# Patient Record
Sex: MALE | Race: WHITE | Employment: FULL TIME | URBAN - METROPOLITAN AREA
[De-identification: names, ages, dates, MRNs, and addresses within clinical notes are randomized per-mention and may not be internally consistent; named-entity substitution may affect disease eponyms.]

---

## 2017-03-08 DIAGNOSIS — F51.01 PRIMARY INSOMNIA: ICD-10-CM

## 2017-03-08 RX ORDER — ZOLPIDEM TARTRATE 10 MG/1
10 TABLET ORAL
Qty: 14 TABLET | Refills: 0 | Status: SHIPPED | OUTPATIENT
Start: 2017-03-08 | End: 2017-05-22

## 2017-03-08 NOTE — TELEPHONE ENCOUNTER
Ambien 10mg      Last Written Prescription Date:  12/9/16  Last Fill Quantity: 14,   # refills: 0  Last Office Visit with [facility], [facility] or [facility] prescribing provider: 2/17/16  Future Office visit:       Routing refill request to provider for review/approval because:  Drug not on the [facility], [facility] or [facility] refill protocol or controlled substance      Azar Guardado CPhT  [facility]    On behalf of [facility]

## 2017-05-04 DIAGNOSIS — I10 BENIGN ESSENTIAL HYPERTENSION: ICD-10-CM

## 2017-05-04 RX ORDER — LISINOPRIL 10 MG/1
TABLET ORAL
Qty: 30 TABLET | Refills: 0 | Status: SHIPPED | OUTPATIENT
Start: 2017-05-04 | End: 2017-08-23

## 2017-05-04 NOTE — TELEPHONE ENCOUNTER
Last OV-2/17/16    Last Basic Metabolic Panel:  Lab Results   Component Value Date     03/07/2016      Lab Results   Component Value Date    POTASSIUM 4.2 03/07/2016     Lab Results   Component Value Date    CHLORIDE 102 03/07/2016     Lab Results   Component Value Date    SHARON 8.8 03/07/2016     Lab Results   Component Value Date    CO2 27 03/07/2016     Lab Results   Component Value Date    BUN 12 03/07/2016     Lab Results   Component Value Date    CR 0.69 03/07/2016     Lab Results   Component Value Date    GLC 87 03/07/2016

## 2017-05-22 DIAGNOSIS — F51.01 PRIMARY INSOMNIA: ICD-10-CM

## 2017-05-22 RX ORDER — ZOLPIDEM TARTRATE 10 MG/1
10 TABLET ORAL
Qty: 14 TABLET | Refills: 0 | Status: SHIPPED | OUTPATIENT
Start: 2017-05-22 | End: 2017-08-25

## 2017-08-21 DIAGNOSIS — F51.01 PRIMARY INSOMNIA: ICD-10-CM

## 2017-08-21 DIAGNOSIS — I10 BENIGN ESSENTIAL HYPERTENSION: ICD-10-CM

## 2017-08-21 RX ORDER — LISINOPRIL 10 MG/1
TABLET ORAL
Qty: 30 TABLET | Refills: 0 | OUTPATIENT
Start: 2017-08-21

## 2017-08-21 RX ORDER — ZOLPIDEM TARTRATE 10 MG/1
10 TABLET ORAL
Qty: 14 TABLET | Refills: 0 | OUTPATIENT
Start: 2017-08-21

## 2017-08-21 NOTE — TELEPHONE ENCOUNTER
Ambien 10mg      Last Written Prescription Date: 05/22/2017  Last Fill Quantity: 14,  # refills: 0   Last Office Visit with [facility], [facility] or [facility] prescribing provider: 02/17/2017    Thanks!    Alia Pranke, PharmacyTechnician   [facility]

## 2017-08-21 NOTE — TELEPHONE ENCOUNTER
Lisinopril      Last Written Prescription Date: 05/04/17  Last Fill Quantity: 30, # refills: 0  Last Office Visit with [facility], [facility] or [facility] prescribing provider: 02/17/17 Avelar       Potassium   Date Value Ref Range Status   03/07/2016 4.2 3.4 - 5.3 mmol/L Final     Creatinine   Date Value Ref Range Status   03/07/2016 0.69 0.66 - 1.25 mg/dL Final     BP Readings from Last 3 Encounters:   03/07/16 122/70   02/17/16 (!) 160/106   02/18/15 (!) 178/100

## 2017-08-23 ENCOUNTER — TELEPHONE (OUTPATIENT)
Dept: INTERNAL MEDICINE | Facility: CLINIC | Age: 42
End: 2017-08-23

## 2017-08-23 DIAGNOSIS — I10 BENIGN ESSENTIAL HYPERTENSION: ICD-10-CM

## 2017-08-23 RX ORDER — LISINOPRIL 10 MG/1
10 TABLET ORAL DAILY
Qty: 30 TABLET | Refills: 0 | Status: SHIPPED | OUTPATIENT
Start: 2017-08-23 | End: 2017-08-25

## 2017-08-23 NOTE — TELEPHONE ENCOUNTER
Patient would like refill of lisonpril does have an appt for 8/25 with Lavern, needs bridge fill to keep pressures down due to upcoming dental appt. Please call him at [phone], ok to leave a message.

## 2017-08-23 NOTE — TELEPHONE ENCOUNTER
Lisinopril      Last Written Prescription Date: 5/4/17  Last Fill Quantity: 30, # refills: 0  Last Office Visit with [facility], [facility] or [facility] prescribing provider: 2/17/16  Next 5 appointments (look out 90 days)     Aug 25, 2017  8:40 AM CDT   [facility] Whitney C[facility]   [facility]    [address]  [facility] [address]   [phone]                   Potassium   Date Value Ref Range Status   03/07/2016 4.2 3.4 - 5.3 mmol/L Final     Creatinine   Date Value Ref Range Status   03/07/2016 0.69 0.66 - 1.25 mg/dL Final     BP Readings from Last 3 Encounters:   03/07/16 122/70   02/17/16 (!) 160/106   02/18/15 (!) 178/100       Routing refill request to provider for review/approval because:  Patient needs to be seen because it has been more than 1 year since last office visit.

## 2017-08-25 ENCOUNTER — OFFICE VISIT (OUTPATIENT)
Dept: INTERNAL MEDICINE | Facility: CLINIC | Age: 42
End: 2017-08-25
Payer: COMMERCIAL

## 2017-08-25 VITALS
OXYGEN SATURATION: 98 % | BODY MASS INDEX: 29.35 KG/M2 | RESPIRATION RATE: 12 BRPM | HEIGHT: 70 IN | SYSTOLIC BLOOD PRESSURE: 152 MMHG | TEMPERATURE: 97.9 F | DIASTOLIC BLOOD PRESSURE: 92 MMHG | HEART RATE: 76 BPM | WEIGHT: 205 LBS

## 2017-08-25 DIAGNOSIS — I10 BENIGN ESSENTIAL HYPERTENSION: ICD-10-CM

## 2017-08-25 DIAGNOSIS — L72.9 SCALP CYST: ICD-10-CM

## 2017-08-25 DIAGNOSIS — Z13.6 CARDIOVASCULAR SCREENING; LDL GOAL LESS THAN 130: ICD-10-CM

## 2017-08-25 DIAGNOSIS — I10 ESSENTIAL HYPERTENSION: Primary | ICD-10-CM

## 2017-08-25 DIAGNOSIS — F51.01 PRIMARY INSOMNIA: ICD-10-CM

## 2017-08-25 LAB
ALBUMIN SERPL-MCNC: 4 G/DL (ref 3.4–5)
ALP SERPL-CCNC: 43 U/L (ref 40–150)
ALT SERPL W P-5'-P-CCNC: 56 U/L (ref 0–70)
ANION GAP SERPL CALCULATED.3IONS-SCNC: 8 MMOL/L (ref 3–14)
AST SERPL W P-5'-P-CCNC: 33 U/L (ref 0–45)
BASOPHILS # BLD AUTO: 0.1 10E9/L (ref 0–0.2)
BASOPHILS NFR BLD AUTO: 0.8 %
BILIRUB SERPL-MCNC: 0.5 MG/DL (ref 0.2–1.3)
BUN SERPL-MCNC: 12 MG/DL (ref 7–30)
CALCIUM SERPL-MCNC: 9.4 MG/DL (ref 8.5–10.1)
CHLORIDE SERPL-SCNC: 103 MMOL/L (ref 94–109)
CO2 SERPL-SCNC: 27 MMOL/L (ref 20–32)
CREAT SERPL-MCNC: 0.86 MG/DL (ref 0.66–1.25)
DIFFERENTIAL METHOD BLD: NORMAL
EOSINOPHIL # BLD AUTO: 0.2 10E9/L (ref 0–0.7)
EOSINOPHIL NFR BLD AUTO: 2.1 %
ERYTHROCYTE [DISTWIDTH] IN BLOOD BY AUTOMATED COUNT: 13 % (ref 10–15)
GFR SERPL CREATININE-BSD FRML MDRD: >90 ML/MIN/1.7M2
GLUCOSE SERPL-MCNC: 88 MG/DL (ref 70–99)
HCT VFR BLD AUTO: 44.3 % (ref 40–53)
HGB BLD-MCNC: 15.3 G/DL (ref 13.3–17.7)
LYMPHOCYTES # BLD AUTO: 2.4 10E9/L (ref 0.8–5.3)
LYMPHOCYTES NFR BLD AUTO: 33.8 %
MCH RBC QN AUTO: 30.8 PG (ref 26.5–33)
MCHC RBC AUTO-ENTMCNC: 34.5 G/DL (ref 31.5–36.5)
MCV RBC AUTO: 89 FL (ref 78–100)
MONOCYTES # BLD AUTO: 1 10E9/L (ref 0–1.3)
MONOCYTES NFR BLD AUTO: 13.6 %
NEUTROPHILS # BLD AUTO: 3.5 10E9/L (ref 1.6–8.3)
NEUTROPHILS NFR BLD AUTO: 49.7 %
PLATELET # BLD AUTO: 162 10E9/L (ref 150–450)
POTASSIUM SERPL-SCNC: 4 MMOL/L (ref 3.4–5.3)
PROT SERPL-MCNC: 7.7 G/DL (ref 6.8–8.8)
RBC # BLD AUTO: 4.96 10E12/L (ref 4.4–5.9)
SODIUM SERPL-SCNC: 138 MMOL/L (ref 133–144)
TSH SERPL DL<=0.005 MIU/L-ACNC: 1.72 MU/L (ref 0.4–4)
WBC # BLD AUTO: 7.1 10E9/L (ref 4–11)

## 2017-08-25 PROCEDURE — 99214 OFFICE O/P EST MOD 30 MIN: CPT | Performed by: NURSE PRACTITIONER

## 2017-08-25 PROCEDURE — 36415 COLL VENOUS BLD VENIPUNCTURE: CPT | Performed by: NURSE PRACTITIONER

## 2017-08-25 PROCEDURE — 80050 GENERAL HEALTH PANEL: CPT | Performed by: NURSE PRACTITIONER

## 2017-08-25 RX ORDER — LISINOPRIL 10 MG/1
10 TABLET ORAL DAILY
Qty: 90 TABLET | Refills: 1 | Status: SHIPPED | OUTPATIENT
Start: 2017-08-25 | End: 2018-03-16

## 2017-08-25 RX ORDER — ZOLPIDEM TARTRATE 10 MG/1
10 TABLET ORAL
Qty: 14 TABLET | Refills: 3 | Status: SHIPPED | OUTPATIENT
Start: 2017-08-25 | End: 2018-01-24

## 2017-08-25 NOTE — PROGRESS NOTES
"  SUBJECTIVE:   Avery Pimentel Jr. is a 42 year old male who presents to clinic today for the following health issues:      Hypertension Follow-up      Outpatient blood pressures 160/? At dentist last week.     Low Salt Diet: no added salt    Was out of medication for about 3 weeks time     On medication it is usually in normal range         Amount of exercise or physical activity: 2 to 3 days a week.    Problems taking medications regularly: Ran out of meds a few weeks ago.    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  Additional history: as documented    Patient Active Problem List   Diagnosis     NO ACTIVE PROBLEMS     HTN (hypertension)     Fatigue     Insomnia     CARDIOVASCULAR SCREENING; LDL GOAL LESS THAN 130     Overweight     Elevated triglycerides with high cholesterol     Past Surgical History:   Procedure Laterality Date     NO HISTORY OF SURGERY         Social History   Substance Use Topics     Smoking status: Never Smoker     Smokeless tobacco: Not on file     Alcohol use Yes      Comment: once weekly     Family History   Problem Relation Age of Onset     CANCER Mother      cervical     Hypertension Brother      Hypertension Father              Reviewed and updated as needed this visit by clinical staffTobacco  Allergies  Meds       Reviewed and updated as needed this visit by Provider         ROS:  Constitutional, HEENT, cardiovascular, pulmonary, GI, , musculoskeletal, neuro, skin, endocrine and psych systems are negative, except as otherwise noted.      OBJECTIVE:   BP (!) 152/92 (BP Location: Left arm, Patient Position: Chair, Cuff Size: Adult Large)  Pulse 76  Temp 97.9  F (36.6  C) (Oral)  Resp 12  Ht 5' 10\" (1.778 m)  Wt 205 lb (93 kg)  SpO2 98%  BMI 29.41 kg/m2  Body mass index is 29.41 kg/(m^2).  GENERAL: alert and " no distress  RESP: lungs clear to auscultation - no rales, rhonchi or wheezes  CV: regular rate and rhythm, normal S1 S2, no S3 or S4, no murmur, click or rub, no peripheral edema  ABDOMEN: soft, nontender, and bowel sounds normal  MS: no gross musculoskeletal defects noted, no edema  NEURO: Normal strength and tone, mentation intact and speech normal  SKIN: 2 large posterior scalp cyst appearing lesions  PSYCH: mentation appears normal, affect normal/bright    Diagnostic Test Results:  Lab now   Lab future     ASSESSMENT/PLAN:             1. Essential hypertension  Not controlled as out of medication for 3 weeks   On medication blood pressure is normal per report- asked to do blood pressure check after on medication to make sure medication is working as desired   - CBC with platelets differential  - Comprehensive metabolic panel  - TSH with free T4 reflex    2. CARDIOVASCULAR SCREENING; LDL GOAL LESS THAN 130    - Lipid panel reflex to direct LDL; Future    3. Benign essential hypertension    - lisinopril (PRINIVIL/ZESTRIL) 10 MG tablet; Take 1 tablet (10 mg) by mouth daily  Dispense: 90 tablet; Refill: 1    4. 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 Scalp cyst  2 large posterior scalp cyst appearing lesions  - GENERAL SURG ADULT REFERRAL    Patient Instructions   Lab in [address]    Lab fasting when you can   You need to be fasting for 12hrs. You can have water and any meds you are on. But, no food, coffee, tea or diet pop.    Lisinopril refill and ambien    Recheck blood pressure in pharmacy or nurse only in 1-2 weeks     Follow up with any questions or concerns.           ELLIOTT Whitney [facility]

## 2017-08-25 NOTE — MR AVS SNAPSHOT
After Visit Summary   8/25/2017    Avery Pimentel Jr.    MRN: [id number]           Patient Information     Date Of Birth          1975        Visit Information        Provider Department      8/25/2017 8:40 AM Bita Puckett APRN CNP [facility]        Today's Diagnoses     Essential hypertension    -  1    CARDIOVASCULAR SCREENING; LDL GOAL LESS THAN 130        Benign essential hypertension        Primary insomnia          Care Instructions    Lab in [address]    Lab fasting when you can   You need to be fasting for 12hrs. You can have water and any meds you are on. But, no food, coffee, tea or diet pop.    Lisinopril refill and ambien    Recheck blood pressure in pharmacy or nurse only in 1-2 weeks     Follow up with any questions or concerns.               Follow-ups after your visit        Future tests that were ordered for you today     Open Future Orders        Priority Expected Expires Ordered    Lipid panel reflex to direct LDL Routine  8/25/2018 8/25/2017            Who to contact     If you have questions or need follow up information about today's clinic visit or your schedule please contact [facility] directly at [phone].  Normal or non-critical lab and imaging results will be communicated to you by SportsBlogshart, letter or phone within 4 business days after the clinic has received the results. If you do not hear from us within 7 days, please contact the clinic through SportsBlog[employer] or phone. If you have a critical or abnormal lab result, we will notify you by phone as soon as possible.  Submit refill requests through [employer] or call your pharmacy and they will forward the refill request to us. Please allow 3 business days for your refill to be completed.          Additional Information About Your Visit        MyChart Information     [employer] lets you send messages to your doctor, view your test results, renew your prescriptions, schedule appointments  "and more. To sign up, go to [email or web address] . Click on \"Log in\" on the left side of the screen, which will take you to the Welcome page. Then click on \"Sign up Now\" on the right side of the page.     You will be asked to enter the access code listed below, as well as some personal information. Please follow the directions to create your username and password.     Your access code is: [email or web address]  Expires: 2017  9:20 AM     Your access code will  in 90 days. If you need help or a new code, please call your [place] clinic or [phone].        Care EveryWhere ID     This is your Care EveryWhere ID. This could be used by other organizations to access your [place] medical records  [id number]        Your Vitals Were     Pulse Temperature Respirations Height Pulse Oximetry BMI (Body Mass Index)    76 97.9  F (36.6  C) (Oral) 12 5' 10\" (1.778 m) 98% 29.41 kg/m2       Blood Pressure from Last 3 Encounters:   17 (!) 152/92   16 122/70   16 (!) 160/106    Weight from Last 3 Encounters:   17 205 lb (93 kg)   16 200 lb 1.6 oz (90.8 kg)   02/18/15 219 lb 3.2 oz (99.4 kg)              We Performed the Following     CBC with platelets differential     Comprehensive metabolic panel     TSH with free T4 reflex          Where to get your medicines      These medications were sent to [facility] [place], [place] - [address]  [address] [facility] [address]     Phone:  [phone]     lisinopril 10 MG tablet         Some of these will need a paper prescription and others can be bought over the counter.  Ask your nurse if you have questions.     Bring a paper prescription for each of these medications     zolpidem 10 MG tablet          Primary Care Provider Office Phone # Fax #    Mohsen Pelaez [phone]       [address]  [facility] [address]        Equal Access to Services     WILVER TODD: Gisel sandra" west Daniel, heatherda lunevinadaha, qaybta kadariusz barraza, michael sampson mikaelwhitley ruddyjose laanayaperla vira. So [facility] [phone].    ATENCIÓN: Si habla español, tiene a merrill disposición servicios gratuitos de asistencia lingüística. Rosy al [phone].    We comply with applicable federal civil rights laws and [place] laws. We do not discriminate on the basis of race, color, national origin, age, disability sex, sexual orientation or gender identity.            Thank you!     Thank you for choosing [facility]  for your care. Our goal is always to provide you with excellent care. Hearing back from our patients is one way we can continue to improve our services. Please take a few minutes to complete the written survey that you may receive in the mail after your visit with us. Thank you!             Your Updated Medication List - Protect others around you: Learn how to safely use, store and throw away your medicines at www.disposemymeds.org.          This list is accurate as of: 8/25/17  9:20 AM.  Always use your most recent med list.                   Brand Name Dispense Instructions for use Diagnosis    lisinopril 10 MG tablet    PRINIVIL/ZESTRIL    90 tablet    Take 1 tablet (10 mg) by mouth daily    Benign essential hypertension       zolpidem 10 MG tablet    AMBIEN    14 tablet    Take 1 tablet (10 mg) by mouth nightly as needed for sleep    Primary insomnia

## 2017-08-25 NOTE — PATIENT INSTRUCTIONS
Lab in [address]    Lab fasting when you can   You need to be fasting for 12hrs. You can have water and any meds you are on. But, no food, coffee, tea or diet pop.    Lisinopril refill and ambien    Recheck blood pressure in pharmacy or nurse only in 1-2 weeks     Follow up with any questions or concerns.

## 2017-08-25 NOTE — NURSING NOTE
"Chief Complaint   Patient presents with     Recheck Medication     Pt has been out of meds for a few weeks.        Initial BP (!) 152/92 (BP Location: Left arm, Patient Position: Chair, Cuff Size: Adult Large)  Pulse 76  Temp 97.9  F (36.6  C) (Oral)  Resp 12  Ht 5' 10\" (1.778 m)  Wt 205 lb (93 kg)  SpO2 98%  BMI 29.41 kg/m2 Estimated body mass index is 29.41 kg/(m^2) as calculated from the following:    Height as of this encounter: 5' 10\" (1.778 m).    Weight as of this encounter: 205 lb (93 kg).  Medication Reconciliation: complete     STEPAN Roland      "

## 2017-09-09 DIAGNOSIS — I10 BENIGN ESSENTIAL HYPERTENSION: ICD-10-CM

## 2017-09-11 RX ORDER — LISINOPRIL 10 MG/1
TABLET ORAL
Qty: 30 TABLET | Refills: 0 | OUTPATIENT
Start: 2017-09-11

## 2017-11-22 ENCOUNTER — TELEPHONE (OUTPATIENT)
Dept: INTERNAL MEDICINE | Facility: CLINIC | Age: 42
End: 2017-11-22

## 2017-11-22 NOTE — TELEPHONE ENCOUNTER
Panel Management Review      Patient has the following on his problem list:     Hypertension   Last three blood pressure readings:  BP Readings from Last 3 Encounters:   08/25/17 (!) 152/92   03/07/16 122/70   02/17/16 (!) 160/106     Blood pressure: FAILED    HTN Guidelines:  Age 18-59 BP range:  Less than 140/90  Age 60-85 with Diabetes:  Less than 140/90  Age 60-85 without Diabetes:  less than 150/90        Composite cancer screening  Chart review shows that this patient is due/due soon for the following None  Summary:    Patient is due/failing the following:   BP CHECK    Action needed:   Follow up or BP readings.     Type of outreach:    Spoke with pt, nurse only scheduled.    Questions for provider review:    None                                                                                                                                      STEPAN Roland       Chart routed to None .

## 2017-11-24 ENCOUNTER — ALLIED HEALTH/NURSE VISIT (OUTPATIENT)
Dept: NURSING | Facility: CLINIC | Age: 42
End: 2017-11-24

## 2017-11-24 VITALS — HEART RATE: 68 BPM | DIASTOLIC BLOOD PRESSURE: 82 MMHG | SYSTOLIC BLOOD PRESSURE: 132 MMHG

## 2017-11-24 DIAGNOSIS — Z53.9 DIAGNOSIS NOT YET DEFINED: Primary | ICD-10-CM

## 2017-11-24 NOTE — MR AVS SNAPSHOT
"              After Visit Summary   2017    Avery Pimentel Jr.    MRN: [id number]           Patient Information     Date Of Birth          1975        Visit Information        Provider Department      2017 11:15 AM RI IM NURSE [facility]        Today's Diagnoses     DIAGNOSIS NOT YET DEFINED    -  1       Follow-ups after your visit        Who to contact     If you have questions or need follow up information about today's clinic visit or your schedule please contact [facility] directly at [phone].  Normal or non-critical lab and imaging results will be communicated to you by STYLHUNThart, letter or phone within 4 business days after the clinic has received the results. If you do not hear from us within 7 days, please contact the clinic through STYLHUNThart or phone. If you have a critical or abnormal lab result, we will notify you by phone as soon as possible.  Submit refill requests through [employer] or call your pharmacy and they will forward the refill request to us. Please allow 3 business days for your refill to be completed.          Additional Information About Your Visit        MyChart Information     [employer] lets you send messages to your doctor, view your test results, renew your prescriptions, schedule appointments and more. To sign up, go to [email or web address] . Click on \"Log in\" on the left side of the screen, which will take you to the Welcome page. Then click on \"Sign up Now\" on the right side of the page.     You will be asked to enter the access code listed below, as well as some personal information. Please follow the directions to create your username and password.     Your access code is: [email or web address]  Expires: 2018 11:51 AM     Your access code will  in 90 days. If you need help or a new code, please call your [facility] or [phone].        Care EveryWhere ID     This is your Care EveryWhere ID. This could be used by other " organizations to access your [place] medical records  [id number]        Your Vitals Were     Pulse                   68            Blood Pressure from Last 3 Encounters:   11/24/17 132/82   08/25/17 (!) 152/92   03/07/16 122/70    Weight from Last 3 Encounters:   08/25/17 205 lb (93 kg)   02/17/16 200 lb 1.6 oz (90.8 kg)   02/18/15 219 lb 3.2 oz (99.4 kg)              Today, you had the following     No orders found for display       Primary Care Provider Office Phone # Fax #    Mohsen Pelaez [phone]       [address] [facility] [address]        Equal Access to Services     JV CAVAZOS : Hadii casandra luo Somilly, waaxda luqadaha, qaybta kaalmada adeegyada, michael ayala . So [facility] [phone].    ATENCIÓN: Si habla español, tiene a merrill disposición servicios gratuitos de asistencia lingüística. Llame al [phone].    We comply with applicable federal civil rights laws and [place] laws. We do not discriminate on the basis of race, color, national origin, age, disability, sex, sexual orientation, or gender identity.            Thank you!     Thank you for choosing [facility]  for your care. Our goal is always to provide you with excellent care. Hearing back from our patients is one way we can continue to improve our services. Please take a few minutes to complete the written survey that you may receive in the mail after your visit with us. Thank you!             Your Updated Medication List - Protect others around you: Learn how to safely use, store and throw away your medicines at www.disposemymeds.org.          This list is accurate as of: 11/24/17 11:51 AM.  Always use your most recent med list.                   Brand Name Dispense Instructions for use Diagnosis    lisinopril 10 MG tablet    PRINIVIL/ZESTRIL    90 tablet    Take 1 tablet (10 mg) by mouth daily    Benign essential hypertension       zolpidem 10 MG tablet     AMBIEN    14 tablet    Take 1 tablet (10 mg) by mouth nightly as needed for sleep    Primary insomnia

## 2017-11-24 NOTE — NURSING NOTE
"Chief Complaint   Patient presents with     Allied Health Visit     BP check. Pt wishes for 12 months instead of 6. Will call triage in when due in february and explain insurance does not pay much for an office visit so pt is asking to only have to be seen once a year.        Initial /82 (BP Location: Left arm, Patient Position: Chair, Cuff Size: Adult Large)  Pulse 68 Estimated body mass index is 29.41 kg/(m^2) as calculated from the following:    Height as of 8/25/17: 5' 10\" (1.778 m).    Weight as of 8/25/17: 205 lb (93 kg).  Medication Reconciliation: complete     STEPAN Roland      "

## 2018-01-24 DIAGNOSIS — F51.01 PRIMARY INSOMNIA: ICD-10-CM

## 2018-01-24 RX ORDER — ZOLPIDEM TARTRATE 10 MG/1
10 TABLET ORAL
Qty: 14 TABLET | Refills: 3 | Status: SHIPPED | OUTPATIENT
Start: 2018-01-24 | End: 2018-07-30

## 2018-01-24 NOTE — TELEPHONE ENCOUNTER
Ambien      Last Written Prescription Date:  08/25/17  Last Fill Quantity: 14,   # refills: 3  Last Office Visit: 08/25/17  Future Office visit:       Routing refill request to provider for review/approval because:  Drug not on the [facility], P or [facility] refill protocol or controlled substance

## 2018-03-16 DIAGNOSIS — I10 BENIGN ESSENTIAL HYPERTENSION: ICD-10-CM

## 2018-03-20 RX ORDER — LISINOPRIL 10 MG/1
TABLET ORAL
Qty: 90 TABLET | Refills: 1 | Status: SHIPPED | OUTPATIENT
Start: 2018-03-20

## 2018-03-20 NOTE — TELEPHONE ENCOUNTER
"Requested Prescriptions   Pending Prescriptions Disp Refills     lisinopril (PRINIVIL/ZESTRIL) 10 MG tablet [Pharmacy Med Name: LISINOPRIL 10MG TABS] 90 tablet 1     Sig: TAKE ONE TABLET BY MOUTH EVERY DAY    ACE Inhibitors (Including Combos) Protocol Passed    3/16/2018 10:02 AM       Passed - Blood pressure under 140/90 in past 12 months    BP Readings from Last 3 Encounters:   11/24/17 132/82   08/25/17 (!) 152/92   03/07/16 122/70                Passed - Recent (12 mo) or future (30 days) visit within the authorizing provider's specialty    Patient had office visit in the last 12 months or has a visit in the next 30 days with authorizing provider or within the authorizing provider's specialty.  See \"Patient Info\" tab in inbasket, or \"Choose Columns\" in Meds & Orders section of the refill encounter.           Passed - Patient is age 18 or older       Passed - Normal serum creatinine on file in past 12 months    Recent Labs   Lab Test  08/25/17   0922   CR  0.86            Passed - Normal serum potassium on file in past 12 months    Recent Labs   Lab Test  08/25/17   0922   POTASSIUM  4.0               Prescription approved per [facility] Refill Protocol.    "

## 2018-07-30 DIAGNOSIS — F51.01 PRIMARY INSOMNIA: ICD-10-CM

## 2018-07-30 RX ORDER — ZOLPIDEM TARTRATE 10 MG/1
10 TABLET ORAL
Qty: 14 TABLET | Refills: 3 | Status: SHIPPED | OUTPATIENT
Start: 2018-07-30 | End: 2020-04-28

## 2020-04-28 ENCOUNTER — VIRTUAL VISIT (OUTPATIENT)
Dept: INTERNAL MEDICINE | Facility: CLINIC | Age: 45
End: 2020-04-28
Payer: COMMERCIAL

## 2020-04-28 DIAGNOSIS — I10 ESSENTIAL HYPERTENSION: Primary | ICD-10-CM

## 2020-04-28 DIAGNOSIS — F51.01 PRIMARY INSOMNIA: ICD-10-CM

## 2020-04-28 PROCEDURE — 99213 OFFICE O/P EST LOW 20 MIN: CPT | Mod: 95 | Performed by: INTERNAL MEDICINE

## 2020-04-28 RX ORDER — ZOLPIDEM TARTRATE 10 MG/1
10 TABLET ORAL
Qty: 14 TABLET | Refills: 3 | Status: SHIPPED | OUTPATIENT
Start: 2020-04-28 | End: 2020-11-25

## 2020-04-28 NOTE — PROGRESS NOTES
"Avery Pimentel Jr. is a 44 year old male who is being evaluated via a billable telephone visit.      The patient has been notified of following:     \"This telephone visit will be conducted via a call between you and your physician/provider. We have found that certain health care needs can be provided without the need for a physical exam.  This service lets us provide the care you need with a short phone conversation.  If a prescription is necessary we can send it directly to your pharmacy.  If lab work is needed we can place an order for that and you can then stop by our lab to have the test done at a later time.    Telephone visits are billed at different rates depending on your insurance coverage. During this emergency period, for some insurers they may be billed the same as an in-person visit.  Please reach out to your insurance provider with any questions.    If during the course of the call the physician/provider feels a telephone visit is not appropriate, you will not be charged for this service.\"    Patient has given verbal consent for Telephone visit?  Yes      Subjective     Avery Pimentel Jr. is a 44 year old male who presents to clinic today for the following health issues:    HPI  Sleep Issues:    Presents with difficulty sleeping . Related to being off work, worrying, anxious. Unable to fall asleep for 3 hours. Feels tired during the day. No symptoms of depression.   Has had similar problems in the past. Has used Ambien for help with episodes of insomnia. Has tried Melatonin now, without effect.   No previous adverse medications effects.   Has h/o HTN. Was on Lisinopril , but has stopped it as his blood pressure is controlled on diet and exercise. Has lost weight with exercise, running.          Patient Active Problem List   Diagnosis     NO ACTIVE PROBLEMS     HTN (hypertension)     Fatigue     Insomnia     CARDIOVASCULAR SCREENING; LDL GOAL LESS THAN 130     Overweight     Elevated triglycerides " with high cholesterol     Past Surgical History:   Procedure Laterality Date     NO HISTORY OF SURGERY         Social History     Tobacco Use     Smoking status: Never Smoker     Smokeless tobacco: Never Used   Substance Use Topics     Alcohol use: Yes     Comment: once weekly     Family History   Problem Relation Age of Onset     Cancer Mother         cervical     Hypertension Brother      Hypertension Father          Current Outpatient Medications   Medication Sig Dispense Refill     zolpidem (AMBIEN) 10 MG tablet Take 1 tablet (10 mg) by mouth nightly as needed for sleep 14 tablet 3     lisinopril (PRINIVIL/ZESTRIL) 10 MG tablet TAKE ONE TABLET BY MOUTH EVERY DAY (Patient not taking: Reported on 4/28/2020) 90 tablet 1       Reviewed and updated as needed this visit by Provider         Review of Systems   ROS COMP: Constitutional, HEENT, cardiovascular, pulmonary, gi and gu systems are negative, except as otherwise noted.       Objective   Normal speech.   Remainder of exam unable to be completed due to telephone visits    Diagnostic Test Results:  Labs reviewed in Epic        Assessment/Plan:  1. Primary insomnia  Short term Ambien, discussed side effects   - zolpidem (AMBIEN) 10 MG tablet; Take 1 tablet (10 mg) by mouth nightly as needed for sleep  Dispense: 14 tablet; Refill: 3    2. Essential hypertension  Off Lisinopril, monitor BP           Phone call duration:  12 minutes    Mohsen Pelaez MD

## 2020-11-24 DIAGNOSIS — F51.01 PRIMARY INSOMNIA: ICD-10-CM

## 2020-11-25 RX ORDER — ZOLPIDEM TARTRATE 10 MG/1
TABLET ORAL
Qty: 14 TABLET | Refills: 0 | Status: SHIPPED | OUTPATIENT
Start: 2020-11-25 | End: 2021-03-18

## 2020-11-25 NOTE — TELEPHONE ENCOUNTER
Pending Prescriptions:                       Disp   Refills    zolpidem (AMBIEN) 10 MG tablet [Pharmacy M*14 tab*0        Sig: TAKE 1 TABLET BY MOUTH NIGHTLY AS NEEDED FOR SLEEP      Routing refill request to provider for review/approval because:  Drug not on the [facility] refill protocol     Please advise, thanks.

## 2021-03-17 DIAGNOSIS — F51.01 PRIMARY INSOMNIA: ICD-10-CM

## 2021-03-18 RX ORDER — ZOLPIDEM TARTRATE 10 MG/1
TABLET ORAL
Qty: 14 TABLET | Refills: 0 | Status: SHIPPED | OUTPATIENT
Start: 2021-03-18

## 2021-03-18 NOTE — TELEPHONE ENCOUNTER
Pending Prescriptions:                       Disp   Refills    zolpidem (AMBIEN) 10 MG tablet [Pharmacy M*14 tab*0        Sig: TAKE 1 TABLET BY MOUTH NIGHTLY AS NEEDED FOR SLEEP    Routing refill request to provider for review/approval because:  Drug not on the [facility] refill protocol

## 2021-06-16 DIAGNOSIS — F51.01 PRIMARY INSOMNIA: ICD-10-CM

## 2021-06-16 RX ORDER — ZOLPIDEM TARTRATE 10 MG/1
TABLET ORAL
Qty: 14 TABLET | Refills: 0 | OUTPATIENT
Start: 2021-06-16

## 2021-06-16 NOTE — TELEPHONE ENCOUNTER
Controlled Substance Refill Request for   zolpidem (AMBIEN) 10 MG tablet 14 tablet 0 3/18/2021       Problem List Complete:  No     PROVIDER TO CONSIDER COMPLETION OF PROBLEM LIST AND OVERVIEW/CONTROLLED SUBSTANCE AGREEMENT    Last Written Prescription Date:  3/18/2021  Last Fill Quantity: 14,   # refills: 0    THE MOST RECENT OFFICE VISIT MUST BE WITHIN THE PAST 3 MONTHS. AT LEAST ONE FACE TO FACE VISIT MUST OCCUR EVERY 6 MONTHS. ADDITIONAL VISITS CAN BE VIRTUAL.  (THIS STATEMENT SHOULD BE DELETED.)    Last Office Visit with [facility] [place] primary care provider: 4/28/2020    Future Office visit:     Controlled substance agreement:   Encounter-Level CSA:    There are no encounter-level csa.     Patient-Level CSA:    There are no patient-level csa.         Last Urine Drug Screen: No results found for: CDAUT, No results found for: COMDAT, No results found for: THC13, PCP13, COC13, MAMP13, OPI13, AMP13, BZO13, TCA13, MTD13, BAR13, OXY13, PPX13, BUP13     Processing:  Rx to be electronically transmitted to pharmacy by provider      [email or web address]            
Routing refill request to provider for review/approval because:  Drug not on the [facility] refill protocol     Cristin Barksdale RN           
normal...